# Patient Record
Sex: MALE | Race: WHITE | Employment: FULL TIME | URBAN - METROPOLITAN AREA
[De-identification: names, ages, dates, MRNs, and addresses within clinical notes are randomized per-mention and may not be internally consistent; named-entity substitution may affect disease eponyms.]

---

## 2024-01-27 ENCOUNTER — APPOINTMENT (OUTPATIENT)
Dept: GENERAL RADIOLOGY | Age: 19
End: 2024-01-27
Payer: MEDICAID

## 2024-01-27 ENCOUNTER — HOSPITAL ENCOUNTER (EMERGENCY)
Age: 19
Discharge: HOME | End: 2024-01-27
Payer: MEDICAID

## 2024-01-27 VITALS
BODY MASS INDEX: 20.04 KG/M2 | SYSTOLIC BLOOD PRESSURE: 122 MMHG | OXYGEN SATURATION: 99 % | RESPIRATION RATE: 16 BRPM | DIASTOLIC BLOOD PRESSURE: 74 MMHG | TEMPERATURE: 98.7 F | WEIGHT: 140 LBS | HEIGHT: 70 IN | HEART RATE: 75 BPM

## 2024-01-27 DIAGNOSIS — M77.01 MEDIAL EPICONDYLITIS OF RIGHT ELBOW: Primary | ICD-10-CM

## 2024-01-27 PROBLEM — M54.50 ACUTE BILATERAL LOW BACK PAIN WITHOUT SCIATICA: Status: ACTIVE | Noted: 2019-10-28

## 2024-01-27 PROBLEM — F90.2 ATTENTION DEFICIT HYPERACTIVITY DISORDER (ADHD), COMBINED TYPE: Status: ACTIVE | Noted: 2019-09-04

## 2024-01-27 PROBLEM — R42 DIZZINESS AND GIDDINESS: Status: ACTIVE | Noted: 2021-03-31

## 2024-01-27 PROBLEM — R51.9 NONINTRACTABLE EPISODIC HEADACHE: Status: ACTIVE | Noted: 2021-03-31

## 2024-01-27 PROCEDURE — 99284 EMERGENCY DEPT VISIT MOD MDM: CPT

## 2024-01-27 PROCEDURE — 6360000002 HC RX W HCPCS

## 2024-01-27 PROCEDURE — 96372 THER/PROPH/DIAG INJ SC/IM: CPT

## 2024-01-27 PROCEDURE — 73080 X-RAY EXAM OF ELBOW: CPT

## 2024-01-27 RX ORDER — DEXAMETHASONE SODIUM PHOSPHATE 10 MG/ML
10 INJECTION INTRAMUSCULAR; INTRAVENOUS
Status: COMPLETED | OUTPATIENT
Start: 2024-01-27 | End: 2024-01-27

## 2024-01-27 RX ORDER — PREDNISONE 20 MG/1
TABLET ORAL
Qty: 10 TABLET | Refills: 0 | Status: SHIPPED | OUTPATIENT
Start: 2024-01-27 | End: 2024-02-04

## 2024-01-27 RX ORDER — DEXAMETHASONE SODIUM PHOSPHATE 10 MG/ML
INJECTION INTRAMUSCULAR; INTRAVENOUS
Status: COMPLETED
Start: 2024-01-27 | End: 2024-01-27

## 2024-01-27 RX ADMIN — DEXAMETHASONE SODIUM PHOSPHATE 10 MG: 10 INJECTION INTRAMUSCULAR; INTRAVENOUS at 17:26

## 2024-01-27 ASSESSMENT — PAIN - FUNCTIONAL ASSESSMENT
PAIN_FUNCTIONAL_ASSESSMENT: NONE - DENIES PAIN
PAIN_FUNCTIONAL_ASSESSMENT: NONE - DENIES PAIN

## 2024-01-27 ASSESSMENT — PAIN SCALES - GENERAL: PAINLEVEL_OUTOF10: 0

## 2024-01-27 NOTE — ED PROVIDER NOTES
Emergency Department Provider Note       PCP: Trisha Amado MD   Age: 18 y.o.   Sex: male     DISPOSITION Decision To Discharge 01/27/2024 06:01:09 PM       ICD-10-CM    1. Medial epicondylitis of right elbow  M77.01 predniSONE (DELTASONE) 20 MG tablet     [facility]          Medical Decision Making     Complexity of Problems Addressed:  Complexity of Problem: 1 acute, uncomplicated illness or injury.    Data Reviewed and Analyzed:  I independently ordered and reviewed each unique test.  I reviewed external records: provider visit note from PCP.   Reviewed notes from primary care provider 6/27/2023      I interpreted the X-rays.  X-ray right elbow is negative for acute bony abnormality, fracture or dislocation.  I am in agreement with radiologist interpreted    Discussion of management or test interpretation.  Vital signs reviewed, patient stable, NAD, afebrile, nontoxic in appearance     In summary this is a well-appearing 18-year-old male who presents for worsening pain in his right forearm that seems to radiate from his right elbow into fingers 3 4 and 5 of his right hand.  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  States he woke up 1 morning and he had this elbow pain and pain has been getting worse radiating into his arm.  States sometimes pain is achy, sometimes he has tingling.  States his sensation is intact.  States that with use his  becomes more weak.    Physical exam today is consistent with likely medial epicondylitis.  Pain is elicited with palpation of the medial epicondyle with passive extension of the right elbow and extension of right wrist.  Pain radiating into right hand.  He is neurovascularly intact.  Bilateral  strength is 5+ and equal.  No evidence of infection at this time.  No ecchymosis noted to right arm upper or lower aspects.  No tenderness to right trapezius muscle, no right  right arm radiating from elbow into right hand for the past 1.5 to 2 weeks.  Known injury.  Patient states he woke up in the morning 1 day and he had this pain.  States that pain is gradually been getting worse and radiates down into fingers 3 4 and 5 of his right hand.  He is right-handed.  Does have a repetitive job at [employer] where he throws tires.  States that he intermittently has like tingling in his hands but his sensation is intact.  States that he  strength is getting weaker as the pain is getting worse.    The history is provided by the patient.        Physical Exam     Vitals signs and nursing note reviewed.   Vitals:    01/27/24 1629 01/27/24 1809   BP: (!) 143/85 122/74   Pulse: 89 75   Resp: 18 16   Temp: 98.7 °F (37.1 °C)    TempSrc: Oral    SpO2: 99% 99%   Weight: 63.5 kg (140 lb)    Height: 1.778 m (5' 10\")        Physical Exam  Vitals and nursing note reviewed.   Constitutional:       General: He is not in acute distress.     Appearance: Normal appearance. He is normal weight. He is not ill-appearing, toxic-appearing or diaphoretic.   HENT:      Head: Normocephalic and atraumatic.   Eyes:      General: No scleral icterus.     Extraocular Movements: Extraocular movements intact.      Conjunctiva/sclera: Conjunctivae normal.   Cardiovascular:      Rate and Rhythm: Normal rate.      Pulses: Normal pulses.      Comments: Bilateral radial pulses are 2+ and equal  Pulmonary:      Effort: Pulmonary effort is normal.   Musculoskeletal:         General: Tenderness (Tenderness at medial aspect of right elbow) present. No swelling (No swelling to right elbow, no swelling to right upper arm or right forearm). Normal range of motion.      Cervical back: Normal and normal range of motion. No spasms, tenderness or bony tenderness. No pain with movement.      Comments: Pain elicited with Marisela sign  With passive extension of right elbow with pressure on medial upper condyle and extension of right wrist

## 2024-01-27 NOTE — ED TRIAGE NOTES
Per patient r humeral pain x3 days prior to arrival. Denies injury. 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 States Thursday woke up with tingling in fingers. Instructed to be evaluated in ed to be able to return to work. PMS intact. States increased pain with rom.

## 2024-01-27 NOTE — ED NOTES
I have reviewed discharge instructions with the patient.  The patient verbalized understanding.    Patient left ED via Discharge Method: ambulatory to Home with friend.     Opportunity for questions and clarification provided.       Patient given 1 scripts.         To continue your aftercare when you leave the hospital, you may receive an automated call from our care team to check in on how you are doing.  This is a free service and part of our promise to provide the best care and service to meet your aftercare needs.” If you have questions, or wish to unsubscribe from this service please call [phone].  Thank you for Choosing our [facility] Emergency Department.

## 2024-01-27 NOTE — DISCHARGE INSTRUCTIONS
You were evaluated in the emergency department today for pain in your elbow with radiation into your right forearm.    Physical exam is consistent with likely medial epicondylitis AKA golfers elbow    I have put in a referral to orthopedics for you for follow-up    Recommend you contact your primary care provider on Monday to schedule follow-up as well    You were given a shot of a steroid today    I have written a prescription for steroids to start tomorrow.    Perform exercises I have included in your discharge paperwork.    Recommend stopping repetitive motion work with your right arm.  Continued use can make your condition worse.    Return to the emergency department for chest pain, shortness of breath, signs and symptoms of stroke as listed in your discharge paper  Return if your elbow becomes red, hot, swollen and you are developing fevers

## 2025-05-31 ENCOUNTER — HOSPITAL ENCOUNTER (EMERGENCY)
Age: 20
Discharge: HOME | End: 2025-05-31
Attending: EMERGENCY MEDICINE
Payer: COMMERCIAL

## 2025-05-31 VITALS
RESPIRATION RATE: 17 BRPM | TEMPERATURE: 98.6 F | BODY MASS INDEX: 21.47 KG/M2 | DIASTOLIC BLOOD PRESSURE: 89 MMHG | SYSTOLIC BLOOD PRESSURE: 128 MMHG | HEIGHT: 70 IN | WEIGHT: 150 LBS | HEART RATE: 84 BPM | OXYGEN SATURATION: 99 %

## 2025-05-31 DIAGNOSIS — R30.0 DYSURIA: Primary | ICD-10-CM

## 2025-05-31 LAB — T PALLIDUM AB SER QL IA: NONREACTIVE

## 2025-05-31 PROCEDURE — 87591 N.GONORRHOEAE DNA AMP PROB: CPT

## 2025-05-31 PROCEDURE — 99283 EMERGENCY DEPT VISIT LOW MDM: CPT

## 2025-05-31 PROCEDURE — 87491 CHLMYD TRACH DNA AMP PROBE: CPT

## 2025-05-31 PROCEDURE — 86780 TREPONEMA PALLIDUM: CPT

## 2025-05-31 RX ORDER — DOXYCYCLINE HYCLATE 100 MG
100 TABLET ORAL 2 TIMES DAILY
Qty: 14 TABLET | Refills: 0 | Status: SHIPPED | OUTPATIENT
Start: 2025-05-31 | End: 2025-06-07

## 2025-05-31 RX ORDER — PHENAZOPYRIDINE HYDROCHLORIDE 100 MG/1
200 TABLET, FILM COATED ORAL 3 TIMES DAILY PRN
Qty: 6 TABLET | Refills: 0 | Status: SHIPPED | OUTPATIENT
Start: 2025-05-31 | End: 2025-06-03

## 2025-05-31 ASSESSMENT — PAIN - FUNCTIONAL ASSESSMENT: PAIN_FUNCTIONAL_ASSESSMENT: NONE - DENIES PAIN

## 2025-05-31 NOTE — ED TRIAGE NOTES
Pt ambulatory to triage with steady gait. Pt states \"my penis does not feel normal that started four days ago, there were teeth involved.\" Pt denies any marks on penis, denies penile discharge, denies urinary pain. Pt is requesting STD testing.

## 2025-06-01 NOTE — ED NOTES
Patient mobility status  with no difficulty.     I have reviewed discharge instructions with the patient.  The patient verbalized understanding.    Patient left ED via Discharge Method: ambulatory to Home with  self. .    Opportunity for questions and clarification provided.     Patient given 2 scripts.

## 2025-06-02 NOTE — ED PROVIDER NOTES
Emergency Department Provider Note       PCP: Trisha Amado MD   Age: 19 y.o.   Sex: male     DISPOSITION Decision To Discharge 05/31/2025 08:38:31 PM   DISPOSITION CONDITION Stable            ICD-10-CM    1. Dysuria  R30.0           Medical Decision Making     Penile pain with dysuria, will check RPR gonorrhea chlamydia.  No discharge history very doubtful for gonorrhea, will go ahead and cover for possible chlamydia since can have milder symptoms.     1 acute, uncomplicated illness or injury.  Prescription drug management performed.  Patient was discharged risks and benefits of hospitalization were considered.  Shared medical decision making was utilized in creating the patients health plan today.    I independently ordered and reviewed each unique test.                     History     19-year-old gentleman presents with penile pain and mild dysuria without discharge.  The patient states that condoms.  Concern for possible STD        ROS     Review of Systems   Genitourinary:  Positive for dysuria and penile pain. Negative for genital sores, penile discharge and penile swelling.   All other systems reviewed and are negative.       Physical Exam     Vitals signs and nursing note reviewed:  Vitals:    05/31/25 1936 05/31/25 2045   BP: (!) 183/94 128/89   Pulse: (!) 117 84   Resp: 18 17   Temp: 98.6 °F (37 °C)    TempSrc: Oral    SpO2: 99% 99%   Weight: 68 kg (150 lb)    Height: 1.778 m (5' 10\")       Physical Exam  Vitals and nursing note reviewed.   Constitutional:       General: He is not in acute distress.     Appearance: Normal appearance. He is not ill-appearing.   HENT:      Head: Normocephalic and atraumatic.      Right Ear: External ear normal.      Left Ear: External ear normal.      Nose: Nose normal. No rhinorrhea.   Eyes:      General: No scleral icterus.        Right eye: No discharge.         Left eye: No discharge.      Extraocular Movements: Extraocular movements intact.   Pulmonary:

## 2025-06-04 LAB
C TRACH RRNA SPEC QL NAA+PROBE: NEGATIVE
N GONORRHOEA RRNA SPEC QL NAA+PROBE: NEGATIVE
SPECIMEN SOURCE: NORMAL